# Patient Record
Sex: FEMALE | Race: WHITE | NOT HISPANIC OR LATINO | Employment: OTHER | URBAN - METROPOLITAN AREA
[De-identification: names, ages, dates, MRNs, and addresses within clinical notes are randomized per-mention and may not be internally consistent; named-entity substitution may affect disease eponyms.]

---

## 2019-02-27 ENCOUNTER — LAB REQUISITION (OUTPATIENT)
Dept: LAB | Facility: HOSPITAL | Age: 84
End: 2019-02-27
Payer: MEDICARE

## 2019-02-27 DIAGNOSIS — H92.09 OTALGIA: ICD-10-CM

## 2019-02-27 PROCEDURE — 87070 CULTURE OTHR SPECIMN AEROBIC: CPT | Performed by: SPECIALIST

## 2019-02-27 PROCEDURE — 87205 SMEAR GRAM STAIN: CPT | Performed by: SPECIALIST

## 2019-03-01 LAB
BACTERIA EAR AEROBE CULT: ABNORMAL
GRAM STN SPEC: ABNORMAL

## 2019-09-05 ENCOUNTER — OFFICE VISIT (OUTPATIENT)
Dept: URGENT CARE | Age: 84
End: 2019-09-05
Payer: MEDICARE

## 2019-09-05 ENCOUNTER — APPOINTMENT (OUTPATIENT)
Dept: RADIOLOGY | Age: 84
End: 2019-09-05
Payer: MEDICARE

## 2019-09-05 ENCOUNTER — HOSPITAL ENCOUNTER (OUTPATIENT)
Dept: RADIOLOGY | Facility: HOSPITAL | Age: 84
Discharge: HOME | End: 2019-09-05

## 2019-09-05 VITALS
OXYGEN SATURATION: 98 % | TEMPERATURE: 98 F | HEART RATE: 73 BPM | SYSTOLIC BLOOD PRESSURE: 137 MMHG | DIASTOLIC BLOOD PRESSURE: 66 MMHG | RESPIRATION RATE: 18 BRPM

## 2019-09-05 DIAGNOSIS — M79.672 LEFT FOOT PAIN: ICD-10-CM

## 2019-09-05 DIAGNOSIS — M79.672 LEFT FOOT PAIN: Primary | ICD-10-CM

## 2019-09-05 PROCEDURE — 99213 OFFICE O/P EST LOW 20 MIN: CPT | Performed by: FAMILY MEDICINE

## 2019-09-05 PROCEDURE — 73630 X-RAY EXAM OF FOOT: CPT

## 2019-09-05 PROCEDURE — G0463 HOSPITAL OUTPT CLINIC VISIT: HCPCS | Performed by: FAMILY MEDICINE

## 2019-09-05 RX ORDER — CHOLECALCIFEROL (VITAMIN D3) 50 MCG
1000 CAPSULE ORAL DAILY
COMMUNITY
Start: 2013-10-04

## 2019-09-05 RX ORDER — AMLODIPINE BESYLATE 5 MG/1
5 TABLET ORAL 2 TIMES DAILY
Refills: 3 | COMMUNITY
Start: 2019-08-05

## 2019-09-05 RX ORDER — SAW/PYGEUM/BETA/HERB/D3/B6/ZN 30 MG-25MG
CAPSULE ORAL
COMMUNITY
Start: 2018-11-28

## 2019-09-05 RX ORDER — METHOCARBAMOL 500 MG/1
TABLET, FILM COATED ORAL
Refills: 0 | COMMUNITY
Start: 2019-08-27

## 2019-09-05 RX ORDER — DIPHENOXYLATE HYDROCHLORIDE AND ATROPINE SULFATE 2.5; .025 MG/1; MG/1
TABLET ORAL DAILY
COMMUNITY
Start: 2013-09-18

## 2019-09-05 RX ORDER — ASPIRIN 81 MG/1
81 TABLET, CHEWABLE ORAL DAILY
COMMUNITY
Start: 2014-10-23

## 2019-09-05 RX ORDER — LISINOPRIL 20 MG/1
20 TABLET ORAL 2 TIMES DAILY
COMMUNITY
Start: 2019-05-30

## 2019-09-05 NOTE — PROGRESS NOTES
[facility]        NAME: Alejandra Ochoa is a 80 y o  female  : 1927    MRN: [id number]  DATE: 2019  TIME: 2:09 PM    Assessment and Plan   Left foot pain [M79 [id number]]  1  Left foot pain  XR foot 3+ vw left         Patient Instructions     Recommend rest, ice, compression, and elevation  Recommend OTC tylenol q 6 hours  Hard flat shoe applied  Follow up with PCP in 3-5 days  Proceed to ER if symptoms worsen  Chief Complaint     Chief Complaint   Patient presents with    Foot Pain     fell last night, "leg went out" injury left foot  denies LOC, c/o hitting head          History of Present Illness       Patient presents with complaint of left foot pain after falling last night  Pt reports hitting her head on the left side but denies any pain or symptoms related to that  Pt is only complaining of left foot pain, which she rates a 5/10, primarily at the base of the great toe  Pt denies dizziness, vision changes, tinnitus, nausea, vomiting, confusion, weakness, lightheadedness, headache, chest pain, dyspnea, fever, chills, paresthesias, and radiation of pain  Pt reports taking a couple extra strength tylenol last night but denies other palliative measures  Review of Systems   Review of Systems   Constitutional: Negative for chills, fatigue and fever  Respiratory: Negative for cough, chest tightness, shortness of breath and wheezing  Cardiovascular: Negative for chest pain and palpitations  Musculoskeletal: Positive for gait problem and joint swelling  Negative for myalgias  Skin: Positive for color change  Negative for rash and wound  Neurological: Negative for dizziness, syncope, weakness, light-headedness, numbness and headaches  All other systems reviewed and are negative          Current Medications       Current Outpatient Medications:     aspirin 81 mg chewable tablet, Chew 81 mg daily, Disp: , Rfl:     Calcium Carb-Cholecalciferol (CALCIUM 1000 + D) 1000-800 MG-UNIT TABS, Take by mouth daily, Disp: , Rfl:     cyanocobalamin (VITAMIN B-12) 100 MCG tablet, Take 100 mcg by mouth daily, Disp: , Rfl:     HM OMEGA-3-6-9 FATTY ACIDS CAPS, Take 1,000 mg by mouth daily, Disp: , Rfl:     lisinopril (ZESTRIL) 20 mg tablet, Take 20 mg by mouth 2 (two) times a day, Disp: , Rfl:     Melatonin ER 10 MG TBCR, 1/2 tablet daily  , Disp: , Rfl:     multivitamin (THERAGRAN) TABS, Take by mouth daily, Disp: , Rfl:     amLODIPine (NORVASC) 5 mg tablet, Take 5 mg by mouth 2 (two) times a day, Disp: , Rfl: 3    LYRICA 25 MG capsule, TAKE 1 CAPSULE BY MOUTH EVERY MORNING, 1 CAPSULE AT NOON AND 2 CAPSULES EVERY NIGHT AT BEDTIME, Disp: , Rfl: 2    metFORMIN (GLUCOPHAGE) 500 mg tablet, Take 250 mg by mouth daily with breakfast, Disp: , Rfl: 1    methocarbamol (ROBAXIN) 500 mg tablet, TAKE 1/2 TABLET BY MOUTH EVERY 12 HOURS AS NEEDED FOR SEVERE MUSCLE SPASMS  MAX 1 TABLET DAILY, Disp: , Rfl: 0    Current Allergies     Allergies as of 09/05/2019 - Reviewed 09/05/2019   Allergen Reaction Noted    Diphenhydramine Rash 10/21/2009            The following portions of the patient's history were reviewed and updated as appropriate: allergies, current medications, past family history, past medical history, past social history, past surgical history and problem list      Past Medical History:   Diagnosis Date    Back pain     Carpal tunnel syndrome     Diabetes mellitus ([address] )     Hypertension        Past Surgical History:   Procedure Laterality Date    BACK SURGERY      CHOLECYSTECTOMY      HYSTERECTOMY      MASTOIDECTOMY      NECK SURGERY      TONSILLECTOMY         History reviewed  No pertinent family history  Medications have been verified  Objective   /66   Pulse 73   Temp 98 °F (36 7 °C)   Resp 18   SpO2 98%        Physical Exam     Physical Exam   Constitutional: She is oriented to person, place, and time  She appears well-developed and well-nourished   No distress  HENT:   Head: Normocephalic and atraumatic  Eyes: Pupils are equal, round, and reactive to light  Conjunctivae and EOM are normal    Neck: Normal range of motion  Neck supple  Cardiovascular: Normal rate, regular rhythm and normal heart sounds  Pulmonary/Chest: Effort normal and breath sounds normal  No respiratory distress  Musculoskeletal: She exhibits edema and tenderness  Left foot: diffuse ecchymosis, mostly at great toe; TTP over great toe; AROM limited d/t pain; sensation intact; cap refill <2   Lymphadenopathy:     She has no cervical adenopathy  Neurological: She is alert and oriented to person, place, and time  No cranial nerve deficit or sensory deficit  Skin: Skin is warm and dry  Capillary refill takes less than 2 seconds  No rash noted  She is not diaphoretic  Psychiatric: She has a normal mood and affect  Her behavior is normal  Thought content normal    Nursing note and vitals reviewed